# Patient Record
Sex: MALE | Race: WHITE | URBAN - METROPOLITAN AREA
[De-identification: names, ages, dates, MRNs, and addresses within clinical notes are randomized per-mention and may not be internally consistent; named-entity substitution may affect disease eponyms.]

---

## 2020-09-08 ENCOUNTER — OFFICE VISIT (OUTPATIENT)
Dept: URBAN - METROPOLITAN AREA CLINIC 32 | Facility: CLINIC | Age: 45
End: 2020-09-08
Payer: COMMERCIAL

## 2020-09-08 DIAGNOSIS — H43.812 VITREOUS DETACHMENT OF LEFT EYE: Primary | ICD-10-CM

## 2020-09-08 DIAGNOSIS — H04.123 DRY EYE SYNDROME: ICD-10-CM

## 2020-09-08 PROCEDURE — 99204 OFFICE O/P NEW MOD 45 MIN: CPT | Performed by: OPTOMETRIST

## 2020-09-08 ASSESSMENT — INTRAOCULAR PRESSURE
OD: 13
OS: 13

## 2020-09-08 ASSESSMENT — KERATOMETRY: OD: 44.50

## 2020-09-08 NOTE — IMPRESSION/PLAN
Impression: Dry Eye Syndrome: P39.621. Plan: Dry eyes account for the patient's complaints. There is no evidence of permanent changes to the cornea. Explained condition does not have a cure and will need artificial tears for maintenance.

## 2020-09-17 ENCOUNTER — OFFICE VISIT (OUTPATIENT)
Dept: URBAN - METROPOLITAN AREA CLINIC 33 | Facility: CLINIC | Age: 45
End: 2020-09-17
Payer: COMMERCIAL

## 2020-09-17 DIAGNOSIS — H43.393 OTHER VITREOUS OPACITIES, BILATERAL: Primary | ICD-10-CM

## 2020-09-17 PROCEDURE — 92134 CPTRZ OPH DX IMG PST SGM RTA: CPT | Performed by: OPHTHALMOLOGY

## 2020-09-17 PROCEDURE — 92004 COMPRE OPH EXAM NEW PT 1/>: CPT | Performed by: OPHTHALMOLOGY

## 2020-09-17 ASSESSMENT — INTRAOCULAR PRESSURE
OS: 13
OD: 12

## 2020-09-17 NOTE — IMPRESSION/PLAN
Impression: Other vitreous opacities, bilateral: H43.393. Bilateral. Condition: new prob, no addtl w/u needed. Condition: stable. Vision: vision not affected. Plan: Posterior vitreous detachment accounts for the patient's complaints. There is no evidence of retinal pathology. All signs and risks of retinal detachment and tears were discussed in detail. Patient instructed to call office immediately if any symptoms noted. OCT shows Vitreous echo - debris OU. Surgery can be consider if floaters become bothersome. Recommend a retina follow - up in 6 mos.

## 2021-03-22 ENCOUNTER — OFFICE VISIT (OUTPATIENT)
Dept: URBAN - METROPOLITAN AREA CLINIC 33 | Facility: CLINIC | Age: 46
End: 2021-03-22
Payer: COMMERCIAL

## 2021-03-22 PROCEDURE — 99213 OFFICE O/P EST LOW 20 MIN: CPT | Performed by: OPHTHALMOLOGY

## 2021-03-22 PROCEDURE — 92134 CPTRZ OPH DX IMG PST SGM RTA: CPT | Performed by: OPHTHALMOLOGY

## 2021-03-22 ASSESSMENT — INTRAOCULAR PRESSURE
OD: 13
OS: 15

## 2021-03-22 NOTE — IMPRESSION/PLAN
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 Bilateral. Condition: worsening. Vision: vision affected OS, stable OD. Plan: Discussed diagnosis in detail with patient. Explained exam shows no evidence of retinal pathology, vitreous condensation OS and vitreous debris OD. OCT OU shows the macula is normal and stable. Discussed signs and symptoms of PVD/floaters. Discussed signs and symptoms of retinal detachment. Discussed how bothersome the floaters are with the patient and options for surgical intervention. Discussed all risks/benefits. No treatment is required at this time. Will continue to observe condition and/or symptoms. Advised patient to call immediately with any sudden changes in vision. Recommend a retina follow-up in 6 months to reassess the condition.

## 2021-10-04 ENCOUNTER — OFFICE VISIT (OUTPATIENT)
Dept: URBAN - METROPOLITAN AREA CLINIC 33 | Facility: CLINIC | Age: 46
End: 2021-10-04
Payer: COMMERCIAL

## 2021-10-04 PROCEDURE — 99213 OFFICE O/P EST LOW 20 MIN: CPT | Performed by: OPHTHALMOLOGY

## 2021-10-04 PROCEDURE — 92134 CPTRZ OPH DX IMG PST SGM RTA: CPT | Performed by: OPHTHALMOLOGY

## 2021-10-04 ASSESSMENT — INTRAOCULAR PRESSURE
OD: 14
OS: 15

## 2021-10-04 NOTE — IMPRESSION/PLAN
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 Bilateral. Condition: stable. Vision: vision affected OS, stable OD. Plan: Discussed diagnosis in detail with patient. Exam shows Vitreous Opacities OU. Patient states they are manageable. No treatment is required at this time. Advise patient if floaters become bothersome, surgery can be consider in the future. OCT is stable OU. Recommend a retina f/u in 6 mos.

## 2022-06-27 ENCOUNTER — OFFICE VISIT (OUTPATIENT)
Dept: URBAN - METROPOLITAN AREA CLINIC 33 | Facility: CLINIC | Age: 47
End: 2022-06-27
Payer: COMMERCIAL

## 2022-06-27 DIAGNOSIS — H43.393 OTHER VITREOUS OPACITIES, BILATERAL: Primary | ICD-10-CM

## 2022-06-27 PROCEDURE — 92134 CPTRZ OPH DX IMG PST SGM RTA: CPT | Performed by: OPHTHALMOLOGY

## 2022-06-27 PROCEDURE — 99213 OFFICE O/P EST LOW 20 MIN: CPT | Performed by: OPHTHALMOLOGY

## 2022-06-27 ASSESSMENT — INTRAOCULAR PRESSURE
OD: 10
OS: 11

## 2022-06-27 NOTE — IMPRESSION/PLAN
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 Bilateral. Condition: stable. Vision: vision affected OS, stable OD. Plan: Discussed diagnosis in detail with patient. Exam shows Vitreous Opacities OU. Patient states they are manageable. No treatment is required at this time. Advise patient if floaters become bothersome, surgery can be consider in the future. OCT is stable OU. Recommend a retina f/u in 12 mos.

## 2022-06-28 ENCOUNTER — OFFICE VISIT (OUTPATIENT)
Dept: URBAN - METROPOLITAN AREA CLINIC 33 | Facility: CLINIC | Age: 47
End: 2022-06-28
Payer: COMMERCIAL

## 2022-06-28 DIAGNOSIS — H52.13 MYOPIA, BILATERAL: Primary | ICD-10-CM

## 2022-06-28 PROCEDURE — 92012 INTRM OPH EXAM EST PATIENT: CPT | Performed by: OPTOMETRIST

## 2022-06-28 ASSESSMENT — VISUAL ACUITY
OD: 20/20
OS: 20/20

## 2022-06-28 NOTE — IMPRESSION/PLAN
Impression: Myopia, bilateral: H52.13. Plan: Issued new glasses for [facility]CV[facility]. First time glasses wearer. Discussed adaptation period with new RX. If interested, patient may return for CL fit within the first 90 days.